# Patient Record
(demographics unavailable — no encounter records)

---

## 2025-04-30 NOTE — PHYSICAL EXAM

## 2025-04-30 NOTE — DISCUSSION/SUMMARY
[Normal Growth] : growth [Normal Development] : development  [No Elimination Concerns] : elimination [Continue Regimen] : feeding [No Skin Concerns] : skin [Normal Sleep Pattern] : sleep [Nutrition and Physical Activity] : nutrition and physical activity [Oral Health] : oral health [Safety] : safety [DTaP] : diptheria, tetanus and pertussis [IPV] : inactivated poliovirus [MMR] : measles, mumps and rubella [Varicella] : varicella [FreeTextEntry1] : offered BHCM  re behavior concerns at home   passed PSC-17 screen

## 2025-04-30 NOTE — HISTORY OF PRESENT ILLNESS
[Mother] : mother [Brushing teeth] : Brushing teeth [Yes] : Patient goes to dentist yearly [In Pre-K] : In Pre-K [Dairy] : dairy [Vitamin] : Patient takes vitamin daily [Normal] : Normal [In own bed] : In own bed [Toothpaste] : Primary Fluoride Source: Toothpaste [Playtime (60 min/d)] : Playtime 60 min a day [Appropiate parent-child-sibling interaction] : Appropriate parent-child-sibling interaction [Child Cooperates] : Child cooperates [Parent has appropriate responses to behavior] : Parent has appropriate responses to behavior [Adequate performance] : Adequate performance [Adequate attention] : Adequate attention [No difficulties with Homework] : No difficulties with homework  [Up to date] : Up to date [NO] : No [FreeTextEntry7] : been well  [de-identified] : behavior at home tends to be whiny, attention seeking, often feels she's the victim  [de-identified] : healthy  [FreeTextEntry9] : swims  karate [de-identified] : no teacher concerns [de-identified] : needs MMR,  V, Dtap, IPV  today

## 2025-04-30 NOTE — DEVELOPMENTAL MILESTONES
[Normal Development] : Normal Development [None] : none [Dresses and undresses without help] : dresses and undresses without help [Is dry through the day] :  is dry through the day [Tells a story of 2 sentences or more] : tells a story of 2 sentences or more [Catches a bounced ball with] : catches a bounced ball with 2 hands [Copies first name] : copies first name [Cuts well with scissors] : cuts well with scissors